# Patient Record
Sex: MALE | Race: WHITE
[De-identification: names, ages, dates, MRNs, and addresses within clinical notes are randomized per-mention and may not be internally consistent; named-entity substitution may affect disease eponyms.]

---

## 2019-03-21 ENCOUNTER — HOSPITAL ENCOUNTER (INPATIENT)
Dept: HOSPITAL 91 - E/R | Age: 44
LOS: 1 days | Discharge: HOME | DRG: 378 | End: 2019-03-22
Payer: MEDICAID

## 2019-03-21 ENCOUNTER — HOSPITAL ENCOUNTER (INPATIENT)
Dept: HOSPITAL 10 - E/R | Age: 44
LOS: 1 days | Discharge: HOME | DRG: 378 | End: 2019-03-22
Payer: COMMERCIAL

## 2019-03-21 VITALS
WEIGHT: 219.8 LBS | BODY MASS INDEX: 31.47 KG/M2 | HEIGHT: 70 IN | WEIGHT: 219.8 LBS | HEIGHT: 70 IN | BODY MASS INDEX: 31.47 KG/M2

## 2019-03-21 DIAGNOSIS — F15.10: ICD-10-CM

## 2019-03-21 DIAGNOSIS — E66.9: ICD-10-CM

## 2019-03-21 DIAGNOSIS — K22.10: ICD-10-CM

## 2019-03-21 DIAGNOSIS — Z71.3: ICD-10-CM

## 2019-03-21 DIAGNOSIS — K92.0: Primary | ICD-10-CM

## 2019-03-21 DIAGNOSIS — F11.10: ICD-10-CM

## 2019-03-21 LAB
ADD MAN DIFF?: NO
ALANINE AMINOTRANSFERASE: 22 IU/L (ref 13–69)
ALBUMIN/GLOBULIN RATIO: 1.25
ALBUMIN: 4.4 G/DL (ref 3.3–4.9)
ALKALINE PHOSPHATASE: 101 IU/L (ref 42–121)
ANION GAP: 12 (ref 5–13)
ASPARTATE AMINO TRANSFERASE: 40 IU/L (ref 15–46)
BASOPHIL #: 0.1 10^3/UL (ref 0–0.1)
BASOPHILS %: 0.4 % (ref 0–2)
BILIRUBIN,DIRECT: 0 MG/DL (ref 0–0.2)
BILIRUBIN,TOTAL: 0.1 MG/DL (ref 0.2–1.3)
BLOOD UREA NITROGEN: 20 MG/DL (ref 7–20)
CALCIUM: 9.5 MG/DL (ref 8.4–10.2)
CARBON DIOXIDE: 32 MMOL/L (ref 21–31)
CHLORIDE: 96 MMOL/L (ref 97–110)
CREATININE: 1.11 MG/DL (ref 0.61–1.24)
EOSINOPHILS #: 0.3 10^3/UL (ref 0–0.5)
EOSINOPHILS %: 2.2 % (ref 0–7)
GLOBULIN: 3.5 G/DL (ref 1.3–3.2)
GLUCOSE: 102 MG/DL (ref 70–220)
HEMATOCRIT: 42.4 % (ref 42–52)
HEMOGLOBIN: 13.6 G/DL (ref 14–18)
IMMATURE GRANS #M: 0.07 10^3/UL (ref 0–0.03)
IMMATURE GRANS % (M): 0.6 % (ref 0–0.43)
INR: 0.8
LYMPHOCYTES #: 3.1 10^3/UL (ref 0.8–2.9)
LYMPHOCYTES %: 26 % (ref 15–51)
MEAN CORPUSCULAR HEMOGLOBIN: 26.4 PG (ref 29–33)
MEAN CORPUSCULAR HGB CONC: 32.1 G/DL (ref 32–37)
MEAN CORPUSCULAR VOLUME: 82.2 FL (ref 82–101)
MEAN PLATELET VOLUME: 9.3 FL (ref 7.4–10.4)
MONOCYTE #: 1 10^3/UL (ref 0.3–0.9)
MONOCYTES %: 8.3 % (ref 0–11)
NEUTROPHIL #: 7.5 10^3/UL (ref 1.6–7.5)
NEUTROPHILS %: 62.5 % (ref 39–77)
NUCLEATED RED BLOOD CELLS #: 0 10^3/UL (ref 0–0)
NUCLEATED RED BLOOD CELLS%: 0 /100WBC (ref 0–0)
PARTIAL THROMBOPLASTIN TIME: 30.9 SEC (ref 23–35)
PLATELET COUNT: 357 10^3/UL (ref 140–415)
POTASSIUM: 4 MMOL/L (ref 3.5–5.1)
PROTIME: 11.2 SEC (ref 11.9–14.9)
PT RATIO: 0.9
RED BLOOD COUNT: 5.16 10^6/UL (ref 4.7–6.1)
RED CELL DISTRIBUTION WIDTH: 13.3 % (ref 11.5–14.5)
SODIUM: 140 MMOL/L (ref 135–144)
TOTAL PROTEIN: 7.9 G/DL (ref 6.1–8.1)
TROPONIN-I: < 0.012 NG/ML (ref 0–0.12)
WHITE BLOOD COUNT: 12 10^3/UL (ref 4.8–10.8)

## 2019-03-21 PROCEDURE — C9113 INJ PANTOPRAZOLE SODIUM, VIA: HCPCS

## 2019-03-21 PROCEDURE — 80061 LIPID PANEL: CPT

## 2019-03-21 PROCEDURE — 96376 TX/PRO/DX INJ SAME DRUG ADON: CPT

## 2019-03-21 PROCEDURE — 88305 TISSUE EXAM BY PATHOLOGIST: CPT

## 2019-03-21 PROCEDURE — 86900 BLOOD TYPING SEROLOGIC ABO: CPT

## 2019-03-21 PROCEDURE — 71045 X-RAY EXAM CHEST 1 VIEW: CPT

## 2019-03-21 PROCEDURE — 86850 RBC ANTIBODY SCREEN: CPT

## 2019-03-21 PROCEDURE — 86901 BLOOD TYPING SEROLOGIC RH(D): CPT

## 2019-03-21 PROCEDURE — 96374 THER/PROPH/DIAG INJ IV PUSH: CPT

## 2019-03-21 PROCEDURE — 83036 HEMOGLOBIN GLYCOSYLATED A1C: CPT

## 2019-03-21 PROCEDURE — 85730 THROMBOPLASTIN TIME PARTIAL: CPT

## 2019-03-21 PROCEDURE — 83735 ASSAY OF MAGNESIUM: CPT

## 2019-03-21 PROCEDURE — 93005 ELECTROCARDIOGRAM TRACING: CPT

## 2019-03-21 PROCEDURE — 99285 EMERGENCY DEPT VISIT HI MDM: CPT

## 2019-03-21 PROCEDURE — 84443 ASSAY THYROID STIM HORMONE: CPT

## 2019-03-21 PROCEDURE — 84484 ASSAY OF TROPONIN QUANT: CPT

## 2019-03-21 PROCEDURE — 88312 SPECIAL STAINS GROUP 1: CPT

## 2019-03-21 PROCEDURE — 85610 PROTHROMBIN TIME: CPT

## 2019-03-21 PROCEDURE — 80053 COMPREHEN METABOLIC PANEL: CPT

## 2019-03-21 PROCEDURE — 85025 COMPLETE CBC W/AUTO DIFF WBC: CPT

## 2019-03-21 PROCEDURE — 36415 COLL VENOUS BLD VENIPUNCTURE: CPT

## 2019-03-21 RX ADMIN — PANTOPRAZOLE SODIUM 1 MLS/HR: 40 INJECTION, POWDER, FOR SOLUTION INTRAVENOUS at 23:47

## 2019-03-21 RX ADMIN — PANTOPRAZOLE SODIUM 1 MLS/HR: 40 INJECTION, POWDER, FOR SOLUTION INTRAVENOUS at 23:27

## 2019-03-21 NOTE — ERD
ER Documentation


Chief Complaint


Chief Complaint





C/O LT SIDED CP RADIATING TO BACK X1 HR, STATES VOMITING BLOOD X1





HPI


43-year-old male history of heroin abuse, peptic ulcer disease who presents to 


the emergency room with epigastric abdominal pain and gross hematemesis over the


past 12 hours.  The patient describes 1-2 episodes of gross hematemesis with 


clots.  He describes some possible darker stool but no son melena.  He denies 


any fevers chills chest pain or shortness of breath.  He does note some burning 


discomfort from the epigastrium into his chest however.





ROS


All systems reviewed and are negative except as per history of present illness.





Medications


Home Meds


Reported Medications


Naproxen* (Naproxen*) 500 Mg Tablet, 500 MG PO BID, TAB


   3/21/19


Omeprazole* (Omeprazole*) 40 Mg Capsule.dr, 40 MG PO DAILY, #30 CAP


   3/21/19


Hydrocodone/Acetaminophen (Norco  Tablet) 1 Each Tablet, 1 EACH PO AS 


NEEDED, TAB


   3/21/19


Discontinued Scripts


Diphenhydramine Hcl* (Benadryl*) 50 Mg Cap, 50 MG PO Q6H PRN for ITCHING/RASH, 


#30 CAP


   Prov:JUAN FENG PA-C         7/28/16


Prednisone* (Prednisone*) 20 Mg Tab, 40 MG PO DAILY for 4 Days, TAB


   Prov:JUAN FENG PA-C         7/28/16


Clotrimazole* (Clotrimazole* AF) 1% - 30 Gm Cream.gm., 1 APPLIC TOP BID for 7 


Days, TUB


   Prov:JUAN FENG PA-C         7/28/16





Allergies


Allergies:  


Coded Allergies:  


     No Known Allergy (Unverified , 3/21/19)





PMhx/Soc


Medical and Surgical Hx:  pt denies Surgical Hx


History of Surgery:  No


Anesthesia Reaction:  No


Hx Neurological Disorder:  No


Hx Respiratory Disorders:  No


Hx Cardiac Disorders:  No


Hx Psychiatric Problems:  No


Hx Miscellaneous Medical Probl:  Yes (ULCER)


Hx Alcohol Use:  No


Hx Substance Use:  Yes (METH AND HEROIN)


Hx Tobacco Use:  Yes


Smoking Status:  Current every day smoker





FmHx


Family History:  No diabetes





Physical Exam


Vitals





Vital Signs


  Date      Temp  Pulse  Resp  B/P (MAP)   Pulse Ox  O2          O2 Flow    FiO2


Time                                                 Delivery    Rate


   3/21/19           62    16      131/84        98  Room Air


     23:32                          (100)


   3/21/19  97.7     81    18      141/77        98


     22:16                           (98)





Physical Exam


General: Well developed, well nourished, no acute distress


Head: Normocephalic, atraumatic.


Eyes: Pupils equally reactive, EOM intact


ENT: Moist mucous membranes


Neck: Supple, no lymphadenopathy


Respiratory: Lungs clear bilaterally, no distress


Cardiovascular: RRR, no murmurs, rubs, or gallops


Abdominal: Soft, non-tender, non-distended, no peritoneal signs


: Deferred


MSK: No edema, no unilateral swelling, 5/5 strength


Neurologic: Alert and oriented, moving all extremities, normal speech, no focal 


weakness, no cerebellar signs


Skin: No rash


Psych: Normal mood


Result Diagram:  


3/21/19 2242                                                                    


           3/21/19 2242





Results 24 hrs





Laboratory Tests


              Test
                                 3/21/19
22:42


              White Blood Count                     12.0 10^3/ul


              Red Blood Count                       5.16 10^6/ul


              Hemoglobin                               13.6 g/dl


              Hematocrit                                  42.4 %


              Mean Corpuscular Volume                    82.2 fl


              Mean Corpuscular Hemoglobin                26.4 pg


              Mean Corpuscular Hemoglobin
Concent     32.1 g/dl 



              Red Cell Distribution Width                 13.3 %


              Platelet Count                         357 10^3/UL


              Mean Platelet Volume                        9.3 fl


              Immature Granulocytes %                    0.600 %


              Neutrophils %                               62.5 %


              Lymphocytes %                               26.0 %


              Monocytes %                                  8.3 %


              Eosinophils %                                2.2 %


              Basophils %                                  0.4 %


              Nucleated Red Blood Cells %            0.0 /100WBC


              Immature Granulocytes #              0.070 10^3/ul


              Neutrophils #                          7.5 10^3/ul


              Lymphocytes #                          3.1 10^3/ul


              Monocytes #                            1.0 10^3/ul


              Eosinophils #                          0.3 10^3/ul


              Basophils #                            0.1 10^3/ul


              Nucleated Red Blood Cells #            0.0 10^3/ul


              Prothrombin Time                          11.2 Sec


              Prothrombin Time Ratio                         0.9


              INR International Normalized
Ratio           0.80 



              Activated Partial
Thromboplast Time      30.9 Sec 



              Sodium Level                            140 mmol/L


              Potassium Level                         4.0 mmol/L


              Chloride Level                           96 mmol/L


              Carbon Dioxide Level                     32 mmol/L


              Anion Gap                                       12


              Blood Urea Nitrogen                       20 mg/dl


              Creatinine                              1.11 mg/dl


              Est Glomerular Filtrat Rate
mL/min   > 60 mL/min 



              Glucose Level                            102 mg/dl


              Calcium Level                            9.5 mg/dl


              Total Bilirubin                          0.1 mg/dl


              Direct Bilirubin                        0.00 mg/dl


              Indirect Bilirubin                       0.1 mg/dl


              Aspartate Amino Transf
(AST/SGOT)         40 IU/L 



              Alanine Aminotransferase
(ALT/SGPT)       22 IU/L 



              Alkaline Phosphatase                      101 IU/L


              Troponin I                           < 0.012 ng/ml


              Total Protein                             7.9 g/dl


              Albumin                                   4.4 g/dl


              Globulin                                 3.50 g/dl


              Albumin/Globulin Ratio                        1.25





Current Medications


 Medications
   Dose
          Sig/Sergey
       Start Time
   Status  Last


 (Trade)       Ordered        Route
 PRN     Stop Time              Admin
Dose


                              Reason                                Admin


 Pantoprazole   100 ml @ 
     ONCE  STAT
    3/21/19       DC           3/21/19


80
 mg/Sodium  400 mls/hr     IVPB
          22:32
                       23:27



Chloride                                     3/21/19 22:46


 Pantoprazole   100 ml @ 
     ONCE  STAT
    3/21/19                    3/21/19


80
 mg/Sodium  10 mls/hr      IV
            22:32
                       23:47



Chloride                                     3/22/19 08:31


 Ondansetron    4 mg           BRIDGE ORDER   3/22/19                



HCl
  (Zofran                 PRN
 IV
       01:00



Inj)                          NAUSEA/VOMITI  3/23/19 00:59


                              NG


                650 mg         ER BRIDGE      3/22/19                



Acetaminophen                 PRN
 PO
       01:00




  (Tylenol                   .MILD PAIN     3/23/19 00:59


Tab)                          1-3 OR TEMP








Procedures/MDM


EKG, MONITORS, & DIAGNOSTIC IMAGING:


EKG: I reviewed and interpreted a 12-lead EKG. 


Rhythm: Normal sinus rhythm


ST Changes: No contiguous ST segment elevations


T waves: No contiguous T wave inversions


Impression: No evidence of acute cardiac ischemia





Chest x-ray: I reviewed and interpreted a 1 view of the chest


Mediastinum: No enlargement


Cardiac silhouette: No cardiomegaly


Airspace: Clear lung fields bilaterally without evidence of pneumothorax


Bones: No evidence of fracture








LAB INTERPRETATION:


I reviewed the laboratory testing and it shows no significant anemia





MEDICAL DECISION MAKING:


The patient has a known history of peptic ulcer disease with 2 episodes of gross


hematemesis raising the concern for active upper GI hemorrhage.  The patient has


hemodynamic stability.  I do not believe that emergent endoscopy is necessary 


but inpatient hospitalization for GI consultation and semi-urgent endoscopy 


would be appropriate.





Patient is also requesting stool ova and parasite evaluation given that he 


states a history of parasitic infection.  He denies any fevers or chills.  He 


does describe some looser stools.  These test have been ordered.  No indication 


for empiric therapy.





ER COURSE:


* Large bore peripheral IV was inserted.  The patient was given Protonix bolus 


  and drip given known peptic ulcer disease.


* No history of cirrhosis or alcohol abuse.


* No indication for transfusion at this time





CONSULTATION:


None





DISPOSITION PLAN: Inpatient hospital station for GI consultation in the setting 


of acute upper GI hemorrhage 


Accepting care team and consultations:


I discussed the current laboratory data, diagnostic imaging and emergency care 


provided.


Admitting team: Dr. Trinidad


Admitting team indication: Insurance directed





Departure


Diagnosis:  


   Primary Impression:  


   Upper GI bleed


Condition:  Stable











BETH HDZ MD          Mar 21, 2019 23:28

## 2019-03-22 VITALS — DIASTOLIC BLOOD PRESSURE: 62 MMHG | HEART RATE: 60 BPM | RESPIRATION RATE: 18 BRPM | SYSTOLIC BLOOD PRESSURE: 116 MMHG

## 2019-03-22 VITALS — HEART RATE: 66 BPM | SYSTOLIC BLOOD PRESSURE: 120 MMHG | DIASTOLIC BLOOD PRESSURE: 61 MMHG | RESPIRATION RATE: 21 BRPM

## 2019-03-22 VITALS — DIASTOLIC BLOOD PRESSURE: 64 MMHG | RESPIRATION RATE: 22 BRPM | SYSTOLIC BLOOD PRESSURE: 119 MMHG | HEART RATE: 58 BPM

## 2019-03-22 VITALS — DIASTOLIC BLOOD PRESSURE: 68 MMHG | SYSTOLIC BLOOD PRESSURE: 123 MMHG | HEART RATE: 54 BPM | RESPIRATION RATE: 18 BRPM

## 2019-03-22 VITALS — DIASTOLIC BLOOD PRESSURE: 71 MMHG | RESPIRATION RATE: 16 BRPM | HEART RATE: 84 BPM | SYSTOLIC BLOOD PRESSURE: 126 MMHG

## 2019-03-22 VITALS — SYSTOLIC BLOOD PRESSURE: 118 MMHG | HEART RATE: 60 BPM | RESPIRATION RATE: 19 BRPM | DIASTOLIC BLOOD PRESSURE: 63 MMHG

## 2019-03-22 VITALS — RESPIRATION RATE: 16 BRPM | SYSTOLIC BLOOD PRESSURE: 119 MMHG | DIASTOLIC BLOOD PRESSURE: 62 MMHG

## 2019-03-22 VITALS — SYSTOLIC BLOOD PRESSURE: 118 MMHG | DIASTOLIC BLOOD PRESSURE: 62 MMHG | HEART RATE: 6 BPM | RESPIRATION RATE: 22 BRPM

## 2019-03-22 VITALS — SYSTOLIC BLOOD PRESSURE: 112 MMHG | RESPIRATION RATE: 18 BRPM | DIASTOLIC BLOOD PRESSURE: 62 MMHG | HEART RATE: 76 BPM

## 2019-03-22 VITALS — RESPIRATION RATE: 17 BRPM | HEART RATE: 60 BPM | SYSTOLIC BLOOD PRESSURE: 113 MMHG | DIASTOLIC BLOOD PRESSURE: 65 MMHG

## 2019-03-22 LAB
ADD MAN DIFF?: NO
ALANINE AMINOTRANSFERASE: 25 IU/L (ref 13–69)
ALBUMIN/GLOBULIN RATIO: 1.22
ALBUMIN: 3.8 G/DL (ref 3.3–4.9)
ALKALINE PHOSPHATASE: 84 IU/L (ref 42–121)
ANION GAP: 10 (ref 5–13)
ASPARTATE AMINO TRANSFERASE: 29 IU/L (ref 15–46)
BASOPHIL #: 0 10^3/UL (ref 0–0.1)
BASOPHILS %: 0.4 % (ref 0–2)
BILIRUBIN,DIRECT: 0 MG/DL (ref 0–0.2)
BILIRUBIN,TOTAL: 0.3 MG/DL (ref 0.2–1.3)
BLOOD UREA NITROGEN: 17 MG/DL (ref 7–20)
CALCIUM: 9 MG/DL (ref 8.4–10.2)
CARBON DIOXIDE: 30 MMOL/L (ref 21–31)
CHLORIDE: 101 MMOL/L (ref 97–110)
CHOL/HDL RATIO: 3.9 RATIO
CHOLESTEROL: 137 MG/DL (ref 100–200)
CREATININE: 0.97 MG/DL (ref 0.61–1.24)
EOSINOPHILS #: 0.3 10^3/UL (ref 0–0.5)
EOSINOPHILS %: 3 % (ref 0–7)
GLOBULIN: 3.1 G/DL (ref 1.3–3.2)
GLUCOSE: 101 MG/DL (ref 70–220)
HDL CHOLESTEROL: 35 MG/DL (ref 27–67)
HEMATOCRIT: 40.4 % (ref 42–52)
HEMOGLOBIN A1C: 5.6 % (ref 0–5.9)
HEMOGLOBIN: 13.2 G/DL (ref 14–18)
IMMATURE GRANS #M: 0.02 10^3/UL (ref 0–0.03)
IMMATURE GRANS % (M): 0.2 % (ref 0–0.43)
LDL CHOLESTEROL,CALCULATED: 71 MG/DL
LYMPHOCYTES #: 3.2 10^3/UL (ref 0.8–2.9)
LYMPHOCYTES %: 31.7 % (ref 15–51)
MAGNESIUM: 2 MG/DL (ref 1.7–2.5)
MEAN CORPUSCULAR HEMOGLOBIN: 26.5 PG (ref 29–33)
MEAN CORPUSCULAR HGB CONC: 32.7 G/DL (ref 32–37)
MEAN CORPUSCULAR VOLUME: 81 FL (ref 82–101)
MEAN PLATELET VOLUME: 9.5 FL (ref 7.4–10.4)
MONOCYTE #: 0.9 10^3/UL (ref 0.3–0.9)
MONOCYTES %: 8.5 % (ref 0–11)
NEUTROPHIL #: 5.7 10^3/UL (ref 1.6–7.5)
NEUTROPHILS %: 56.2 % (ref 39–77)
NUCLEATED RED BLOOD CELLS #: 0 10^3/UL (ref 0–0)
NUCLEATED RED BLOOD CELLS%: 0 /100WBC (ref 0–0)
PLATELET COUNT: 328 10^3/UL (ref 140–415)
POTASSIUM: 3.9 MMOL/L (ref 3.5–5.1)
RED BLOOD COUNT: 4.99 10^6/UL (ref 4.7–6.1)
RED CELL DISTRIBUTION WIDTH: 13.6 % (ref 11.5–14.5)
SODIUM: 141 MMOL/L (ref 135–144)
THYROID STIMULATING HORMONE: 2.2 MIU/L (ref 0.47–4.68)
TOTAL PROTEIN: 6.9 G/DL (ref 6.1–8.1)
TRIGLYCERIDES: 153 MG/DL (ref 0–149)
WHITE BLOOD COUNT: 10.1 10^3/UL (ref 4.8–10.8)

## 2019-03-22 PROCEDURE — 0DB68ZX EXCISION OF STOMACH, VIA NATURAL OR ARTIFICIAL OPENING ENDOSCOPIC, DIAGNOSTIC: ICD-10-PCS | Performed by: INTERNAL MEDICINE

## 2019-03-22 PROCEDURE — 0DB78ZX EXCISION OF STOMACH, PYLORUS, VIA NATURAL OR ARTIFICIAL OPENING ENDOSCOPIC, DIAGNOSTIC: ICD-10-PCS

## 2019-03-22 PROCEDURE — 0DB68ZX EXCISION OF STOMACH, VIA NATURAL OR ARTIFICIAL OPENING ENDOSCOPIC, DIAGNOSTIC: ICD-10-PCS

## 2019-03-22 PROCEDURE — 0DB78ZX EXCISION OF STOMACH, PYLORUS, VIA NATURAL OR ARTIFICIAL OPENING ENDOSCOPIC, DIAGNOSTIC: ICD-10-PCS | Performed by: INTERNAL MEDICINE

## 2019-03-22 RX ADMIN — MORPHINE SULFATE 1 MG: 2 INJECTION, SOLUTION INTRAMUSCULAR; INTRAVENOUS at 09:09

## 2019-03-22 RX ADMIN — THIAMINE HYDROCHLORIDE 1 MLS/HR: 100 INJECTION, SOLUTION INTRAMUSCULAR; INTRAVENOUS at 13:27

## 2019-03-22 RX ADMIN — VASOPRESSIN 1 MLS/HR: 20 INJECTION, SOLUTION INTRAMUSCULAR; SUBCUTANEOUS at 08:32

## 2019-03-22 RX ADMIN — FOLIC ACID SCH MLS/HR: 5 INJECTION, SOLUTION INTRAMUSCULAR; INTRAVENOUS; SUBCUTANEOUS at 13:27

## 2019-03-22 RX ADMIN — FOLIC ACID SCH MLS/HR: 5 INJECTION, SOLUTION INTRAMUSCULAR; INTRAVENOUS; SUBCUTANEOUS at 00:57

## 2019-03-22 RX ADMIN — NICOTINE 1 PATCH: 21 PATCH, EXTENDED RELEASE TRANSDERMAL at 09:06

## 2019-03-22 RX ADMIN — THIAMINE HYDROCHLORIDE 1 MLS/HR: 100 INJECTION, SOLUTION INTRAMUSCULAR; INTRAVENOUS at 00:57

## 2019-03-22 NOTE — DS
Date/Time of Note


Date/Time of Note


DATE: 3/22/19 


TIME: 15:54





Discharge Summary


Admission/Discharge Info


Admit Date/Time


Mar 22, 2019 at 00:55


Discharge Date/Time


Mar 22, 2019 at 15:20


Discharge Diagnosis


1.  Hematemesis.





2.  Ulcer of the esophagus without bleeding.





3.  Obesity.  BMI 31.5 kg/m.





4.  Substance abuse.


Patient Condition:  Stable


Consults


1. Cheko Díaz MD, Gastroenterology.


Procedures


Esophagogastroduodenoscopy on 3/22/2019


Impression: Ulcer of the esophagus without bleeding.


Hx of Present Illness


This is a 43-year-old male with past medical history of heroin abuse, peptic 


ulcer disease, and obesity who came to the emergency room with epigastric 


abdominal pain and gross hematemesis.  The patient denied any melena or 


hematochezia.  The patient denied any chest pain or dyspnea.  The patient had a 


hemoglobin hematocrit of 13.6 and 42.4 respectively.  The patient was admitted 


to inpatient setting for further treatment and evaluation.


Hospital Course


The patient was admitted to inpatient setting.  The patient underwent an 


esophagogastroduodenoscopy on 3/22/2019 that showed an esophageal ulcer.  


Gastroenterology recommended proton pump inhibitor therapy.  The patient's H&H 


remained stable.  The patient was cleared by gastroenterology to be discharged 


home.  The patient is obese with a BMI of 31.5 kg/m.  The patient was advised 


on weight reduction.  The patient is also a current substance abuser.  The 


patient was advised to abstain from using recreational drugs and alcohol.





Discharge Instructions


1.  Take a regular diet.


2.  Start taking Protonix twice daily.


3.  Avoid consuming alcohol.


4.  Resume activities as tolerated.


5.  Follow-up with your primary care physician in 2 weeks.


6.  Please go to the nearest emergency room if you have vomiting blood, dark 


tarry stools, or blood in stool.





The patient verbalized understanding of his discharge instructions.





At this time I would like to thank Dr. Díaz for seeing the patient, doing the 


necessary procedures, and providing clinical recommendations.





The patient was seen in collaboration with Dr. Bailon.


Home Meds


Active Scripts


Pantoprazole* (Protonix*) 40 Mg Tablet., 40 MG PO BID, #60 TAB


   Prov:RICKEY WADDELL NP         3/22/19


Discontinued Reported Medications


Naproxen* (Naproxen*) 500 Mg Tablet, 500 MG PO BID, TAB


   3/21/19


Omeprazole* (Omeprazole*) 40 Mg Capsule.dr, 40 MG PO DAILY, #30 CAP


   3/21/19


Hydrocodone/Acetaminophen (Norco  Tablet) 1 Each Tablet, 1 EACH PO AS NEED


ED, TAB


   3/21/19


Discontinued Scripts


Diphenhydramine Hcl* (Benadryl*) 50 Mg Cap, 50 MG PO Q6H PRN for ITCHING/RASH, 


#30 CAP


   Prov:JUAN FENG PA-C         7/28/16


Prednisone* (Prednisone*) 20 Mg Tab, 40 MG PO DAILY for 4 Days, TAB


   Prov:JUAN FENG PA-C         7/28/16


Clotrimazole* (Clotrimazole* AF) 1% - 30 Gm Cream.gm., 1 APPLIC TOP BID for 7 


Days, TUB


   Prov:JUAN FENG PA-C         7/28/16


Follow-up Plan


Follow-up with your primary care physician in 2 weeks.


Primary Care Provider


Care Physician No Primary


Time spent on discharge:   > 30 minutes


Pending Labs





Laboratory Tests


Test
                                   3/21/19
22:42              3/22/19
04:51


White Blood Count
            12.0 10^3/ul
(4.8-10.8)    10.1 10^3/ul
(4.8-10.8)


Red Blood Count
             5.16 10^6/ul
(4.70-6.10)   4.99 10^6/ul
(4.70-6.10)


Hemoglobin
                     13.6 g/dl
(14.0-18.0)      13.2 g/dl
(14.0-18.0)


Hematocrit
                        42.4 %
(42.0-52.0)         40.4 %
(42.0-52.0)


Mean Corpuscular Volume
         82.2 fl
(82.0-101.0)       81.0 fl
(82.0-101.0)


Mean Corpuscular                  26.4 pg
(29.0-33.0)        26.5 pg
(29.0-33.0)


Hemoglobin



Mean Corpuscular                32.1 g/dl
(32.0-37.0)      32.7 g/dl
(32.0-37.0)


Hemoglobin
Concent


Red Cell Distribution              13.3 %
(11.5-14.5)         13.6 %
(11.5-14.5)


Width



Platelet Count
                 357 10^3/UL
(140-415)      328 10^3/UL
(140-415)


Mean Platelet Volume
               9.3 fl
(7.4-10.4)          9.5 fl
(7.4-10.4)


Immature Granulocytes %
        0.600 %
(0.001-0.429)      0.200 %
(0.001-0.429)


Neutrophils %
                     62.5 %
(39.0-77.0)         56.2 %
(39.0-77.0)


Lymphocytes %
                     26.0 %
(15.0-51.0)         31.7 %
(15.0-51.0)


Monocytes %
                         8.3 %
(0.0-11.0)           8.5 %
(0.0-11.0)


Eosinophils %
                        2.2 %
(0.0-7.0)            3.0 %
(0.0-7.0)


Basophils %
                          0.4 %
(0.0-2.0)            0.4 %
(0.0-2.0)


Nucleated Red Blood Cells       0.0 /100WBC
(0.0-0.0)      0.0 /100WBC
(0.0-0.0)


%



Immature Granulocytes #
    0.070 10^3/ul
(0.0-0.031)  0.020 10^3/ul
(0.0-0.031)


Neutrophils #
                  7.5 10^3/ul
(1.6-7.5)      5.7 10^3/ul
(1.6-7.5)


Lymphocytes #
                  3.1 10^3/ul
(0.8-2.9)      3.2 10^3/ul
(0.8-2.9)


Monocytes #
                    1.0 10^3/ul
(0.3-0.9)      0.9 10^3/ul
(0.3-0.9)


Eosinophils #
                  0.3 10^3/ul
(0.0-0.5)      0.3 10^3/ul
(0.0-0.5)


Basophils #
                    0.1 10^3/ul
(0.0-0.1)      0.0 10^3/ul
(0.0-0.1)


Nucleated Red Blood Cells       0.0 10^3/ul
(0.0-0.0)      0.0 10^3/ul
(0.0-0.0)


#



Prothrombin Time
                11.2 Sec
(11.9-14.9)  



Prothrombin Time Ratio                           0.9


INR International                              0.80 
  



Normalized
Ratio


Activated                        30.9 Sec
(23.0-35.0)  



Partial
Thromboplast Time


Sodium Level
                    140 mmol/L
(135-144)       141 mmol/L
(135-144)


Potassium Level
                 4.0 mmol/L
(3.5-5.1)       3.9 mmol/L
(3.5-5.1)


Chloride Level
                    96 mmol/L
()        101 mmol/L
()


Carbon Dioxide Level
               32 mmol/L
(21-31)          30 mmol/L
(21-31)


Anion Gap                                  12 (5-13)                  10 (5-13)


Blood Urea Nitrogen
                  20 mg/dl
(7-20)            17 mg/dl
(7-20)


Creatinine
                    1.11 mg/dl
(0.61-1.24)     0.97 mg/dl
(0.61-1.24)


Est Glomerular Filtrat      > 60 mL/min
(>60)          > 60 mL/min
(>60)


Rate
mL/min


Glucose Level
                     102 mg/dl
()         101 mg/dl
()


Calcium Level
                   9.5 mg/dl
(8.4-10.2)       9.0 mg/dl
(8.4-10.2)


Total Bilirubin
                  0.1 mg/dl
(0.2-1.3)        0.3 mg/dl
(0.2-1.3)


Direct Bilirubin
              0.00 mg/dl
(0.00-0.20)     0.00 mg/dl
(0.00-0.20)


Indirect Bilirubin
                 0.1 mg/dl
(0-1.1)          0.3 mg/dl
(0-1.1)


Aspartate Amino                       40 IU/L
(15-46)            29 IU/L
(15-46)


Transf
(AST/SGOT)


Alanine                               22 IU/L
(13-69)            25 IU/L
(13-69)


Aminotransferase
(ALT/SGPT


)


Alkaline Phosphatase
               101 IU/L
()           84 IU/L
()


Troponin I
                 < 0.012                    



                            ng/ml
(0.000-0.120)


Total Protein
                     7.9 g/dl
(6.1-8.1)         6.9 g/dl
(6.1-8.1)


Albumin
                           4.4 g/dl
(3.3-4.9)         3.8 g/dl
(3.3-4.9)


Globulin
                         3.50 g/dl
(1.3-3.2)        3.10 g/dl
(1.3-3.2)


Albumin/Globulin Ratio                          1.25                       1.22


Hemoglobin A1c                                                    5.6 % (0-5.9)


Magnesium Level
            
                                2.0 mg/dl
(1.7-2.5)


Triglycerides Level
        
                                  153 mg/dl
(0-149)


Cholesterol Level
          
                                137 mg/dl
(100-200)


LDL Cholesterol,                                                       71 mg/dl


Calculated


HDL Cholesterol
            
                                   35 mg/dl
(27-67)


Cholesterol/HDL Ratio                                                 3.9 RATIO


Thyroid Stimulating         
                          2.200 MIU/L
(0.465-4.680)


Hormone
(TSH)














RICKEY WADDELL NP               Mar 22, 2019 15:54

## 2019-03-22 NOTE — PREAC
Date/Time of Note


Date/Time of Note


DATE: 3/22/19 


TIME: 13:06





Anesthesia Eval and Record


Evaluation


Time Pre-Procedure Interview


DATE: 3/22/19 


TIME: 13:06


Age


43


Sex


male


NPO:  8 hrs


Preoperative diagnosis


upper GI bleeding


Planned procedure


EGD





Past Medical History


Past Medical History:  Includes


Pulm:  Smoking Hx, Other (on PATCH)


GI:  Other (peptic ulcer)


Recreational drugs:  Heroin





Surgery & Anesthesia Issues


No known issue





Meds


Anticoagulation:  No


Beta Blocker within 24 hr:  No


Reason Beta Blocker not given:  Pt. not on B-Blocker


Reported Medications


Naproxen* (Naproxen*) 500 Mg Tablet, 500 MG PO BID, TAB


   3/21/19


Omeprazole* (Omeprazole*) 40 Mg Capsule.dr, 40 MG PO DAILY, #30 CAP


   3/21/19


Hydrocodone/Acetaminophen (Norco  Tablet) 1 Each Tablet, 1 EACH PO AS 


NEEDED, TAB


   3/21/19


Discontinued Scripts


Diphenhydramine Hcl* (Benadryl*) 50 Mg Cap, 50 MG PO Q6H PRN for ITCHING/RASH, 


#30 CAP


   Prov:JUAN FENG PA-C         7/28/16


Prednisone* (Prednisone*) 20 Mg Tab, 40 MG PO DAILY for 4 Days, TAB


   Prov:JUAN FENG PA-C         7/28/16


Clotrimazole* (Clotrimazole* AF) 1% - 30 Gm Cream.gm., 1 APPLIC TOP BID for 7 


Days, TUB


   Prov:JUAN FENG PA-C         7/28/16





Current Medications


Sodium Chloride 1,000 ml @  80 mls/hr [id number] IV  Last administered on 3/22/19at


00:57; Admin Dose 80 MLS/HR;  Start 3/22/19 at 00:57


IV Flush (NS 3 ml) 3 ml PER PROTOCOL IV ;  Start 3/22/19 at 01:00


Ondansetron HCl (Zofran Inj) 4 mg Q6H  PRN IV NAUSEA/VOMITING;  Start 3/22/19 at


01:00


Acetaminophen (Tylenol Tab) 650 mg Q6H  PRN PO .PAIN 1-3 OR TEMP;  Start 3/22/19


at 01:00


Morphine Sulfate (morphine) 2 mg Q4H  PRN IV .SEVERE PAIN 7-10 Last administered


on 3/22/19at 09:09; Admin Dose 2 MG;  Start 3/22/19 at 01:00


Docusate Sodium (Colace) 100 mg Q12H  PRN PO .CONSTIPATION;  Start 3/22/19 at 


01:00


Bisacodyl (Dulcolax) 5 mg DAILY  PRN PO .CONSTIPATION;  Start 3/22/19 at 01:00


Pantoprazole 80 mg/Sodium Chloride 100 ml @  10 mls/hr Q10H IV ;  Start 3/22/19 


at 08:32


Meds reviewed:  Yes





Allergies


Coded Allergies:  


     No Known Allergy (Unverified , 3/21/19)


Allergies Reviewed:  Yes





Labs/Studies


Labs Reviewed:  Reviewed by anesthesiologist


Result Diagram:  


3/22/19 0451                                                                    


           3/22/19 0451





Laboratory Tests


3/22/19 04:51











Blood Bank


                         Test
            3/21/19
22:42


                         Antibody Screen  NEGATIVE


                         Blood Type       O POSITIVE





Pregnancy test:  N/A


Studies:  ECG (sr), CXR (n/a)





Pre-procedure Exam


Last vitals





Vital Signs


  Date      Temp  Pulse  Resp  B/P (MAP)   Pulse Ox  O2          O2 Flow    FiO2


Time                                                 Delivery    Rate


   3/22/19  97.3     84    16      126/71        99  Room Air


     12:28                           (89)





Airway:  Adequate mouth opening


Mallampati:  Mallampati I


Teeth:  Normal


Lung:  Normal


Heart:  Normal





ASA Physical Status


ASA physical status:  2


Emergency:  None





Planned Anesthetic


General/MAC:  MAC





Planned Pain Management


Parenteral pain med





Pre-operative Attestations


Prior to commencing anesthesia and surgery, the patient was re-evaluated, there 


was verification of:


*The patient's identity


*The results of appropriate recent lab work and preoperative vital signs


*The above evaluation not changing prior to induction


*Anesthetic plan, risk benefits, alternative and complications discussed with 


patient/family; questions answered; patient/family understands, accepts and 


wishes to proceed.











VIVIAN COUCH MD            Mar 22, 2019 13:08

## 2019-03-22 NOTE — PAC
Date/Time of Note


Date/Time of Note


DATE: 3/22/19 


TIME: 17:35





Post-Anesthesia Notes


Post-Anesthesia Note


Last documented vital signs





Vital Signs


  Date      Temp  Pulse  Resp  B/P (MAP)   Pulse Ox  O2          O2 Flow    FiO2


Time                                                 Delivery    Rate


   3/22/19  98.8      6    22      118/62        97  Room Air


     13:56                           (80)


   3/22/19  98.9


     13:28





Activity:  WNL


Respiratory function:  WNL


Cardiovascular function:  WNL


Mental status:  Baseline


Pain reasonably controlled:  Yes


Hydration appropriate:  Yes


Nausea/Vomiting absent:  No











VIVIAN COUCH MD            Mar 22, 2019 17:35

## 2019-03-22 NOTE — PDOCDIS
Discharge Instructions


CONDITION


                [id number]
Patient Condition:  [id number]
Stable








HOME CARE INSTRUCTIONS:


                [id number]
Diet Instructions:  [id number]
Regular








FOLLOW UP/APPOINTMENTS


Follow-up Plan


Follow-up with your primary care physician in 2 weeks.





OTHER ORDERS:


Other Orders:


1.  Take a regular diet.


2.  Start taking Protonix twice daily.


3.  Avoid consuming alcohol.


4.  Resume activities as tolerated.


5.  Follow-up with your primary care physician in 2 weeks.


6.  Please go to the nearest emergency room if you have vomiting blood, dark 


tarry stools, or blood in stool.











RICKEY WADDELL NP               Mar 22, 2019 14:36

## 2019-03-22 NOTE — HP
Date/Time of Note


Date/Time of Note


DATE: 3/22/19 


TIME: 06:39





Assessment/Plan


VTE Prophylaxis


SCD applied (from Nsg):  Yes


Pharmacological prophylaxis:  NA/contraindicated


Pharm contraindication:  low risk/ambulating





Lines/Catheters


IV Catheter Type (from Nrsg):  Saline Lock





Assessment/Plan


Hospital Course


This is a 43-year-old male being admitted to the [facility] floor for:





#1 hematemesis: Secondary possibly to underlying peptic ulcer disease.  


Hemoglobin is stable.  Continue the patient on Protonix drip.  CBC every 6 


hours.  Will consult GI Dr. Díaz.





#2 peptic ulcer disease: Patient has a history of peptic ulcer disease this 


could be the reason for his hematemesis.  Again we will keep the patient on 


Protonix drip, consult GI check serial CBCs





#3 history of illicit drug use: Monitor for signs of withdrawal as patient 


states he is self detox and ran off of heroin.





#4 obesity: We will check hemoglobin A 1C, lipid panel, TSH





#5 tobacco use: Nicotine patch





#6 DVT GI prophylaxis: SCDs, Protonix drip





Further treatment strategy will be implemented as per the clinical course.


Result Diagram:  


3/22/19 0451                                                                    


           3/22/19 0451





Results 24hrs





Laboratory Tests


       Test
                                3/21/19
22:42  3/22/19
04:51


       White Blood Count                         12.0  #H         10.1


       Red Blood Count                             5.16           4.99


       Hemoglobin                                 13.6  L        13.2  L


       Hematocrit                                  42.4          40.4  L


       Mean Corpuscular Volume                     82.2          81.0  L


       Mean Corpuscular Hemoglobin                26.4  L        26.5  L


       Mean Corpuscular Hemoglobin
Concent        32.1  
        32.7  



       Red Cell Distribution Width                 13.3           13.6


       Platelet Count                               357            328


       Mean Platelet Volume                         9.3            9.5


       Immature Granulocytes %                   0.600  H        0.200


       Neutrophils %                               62.5           56.2


       Lymphocytes %                               26.0           31.7


       Monocytes %                                  8.3            8.5


       Eosinophils %                                2.2            3.0


       Basophils %                                  0.4            0.4


       Nucleated Red Blood Cells %                  0.0            0.0


       Immature Granulocytes #                   0.070  H        0.020


       Neutrophils #                                7.5            5.7


       Lymphocytes #                               3.1  H         3.2  H


       Monocytes #                                 1.0  H          0.9


       Eosinophils #                                0.3            0.3


       Basophils #                                  0.1            0.0


       Nucleated Red Blood Cells #                  0.0            0.0


       Prothrombin Time                           11.2  L


       Prothrombin Time Ratio                       0.9


       INR International Normalized
Ratio         0.80  
  



       Activated Partial
Thromboplast Time        30.9  
  



       Sodium Level                                 140            141


       Potassium Level                              4.0            3.9


       Chloride Level                               96  L          101


       Carbon Dioxide Level                         32  H           30


       Anion Gap                                     12             10


       Blood Urea Nitrogen                           20             17


       Creatinine                                  1.11           0.97


       Est Glomerular Filtrat Rate
mL/min   > 60  
        > 60  



       Glucose Level                                102            101


       Calcium Level                                9.5            9.0


       Total Bilirubin                             0.1  L          0.3


       Direct Bilirubin                            0.00           0.00


       Indirect Bilirubin                           0.1            0.3


       Aspartate Amino Transf
(AST/SGOT)            40  
          29  



       Alanine Aminotransferase
(ALT/SGPT)          22  
          25  



       Alkaline Phosphatase                         101             84


       Troponin I                           < 0.012


       Total Protein                                7.9           6.9  #


       Albumin                                      4.4            3.8


       Globulin                                   3.50  H         3.10


       Albumin/Globulin Ratio                      1.25           1.22


       Hemoglobin A1c                                              5.6


       Magnesium Level                                             2.0


       Triglycerides Level                                        153  H


       Cholesterol Level                                           137


       LDL Cholesterol, Calculated                                  71


       HDL Cholesterol                                              35


       Cholesterol/HDL Ratio                                       3.9


       Thyroid Stimulating Hormone
(TSH)    
                   2.200  









HPI/ROS


Admit Date/Time


Admit Date/Time








Hx of Present Illness


Chief complaint: Hematemesis





This is a 43-year-old male history of heroin abuse, peptic ulcer disease who 


presents to the emergency room with epigastric abdominal pain and gross 


hematemesis over the past 12 hours.  The patient describes 1-2 episodes of gross


hematemesis with clots.  He describes some possible darker stool but no son 


melena.  He denies any fevers chills chest pain or shortness of breath.  He does


note some burning discomfort from the epigastrium into his chest however.  


Patient reports that he is a heroin user and that he is currently on self detox 


for the last 3 days.





Allergies: NKDA





Medications: See MAR





ROS


Const: As per HPI


Eyes : No pain discharge or redness or change in visual acuity


ENT: No pain, sore throat, congestion, congestion,  dysphagia or discharge


Respiratory: No shortness of breath, cough, sputum, wheezing, or pleuritic pain


Cardiovascular: No chest pain, palpitation, PND, or edema


GI : As per HPI


Genitourinary: No dysuria, hematuria, flank pain ,  discharge or CVA tenderness


Musculoskeletal: No joint pain, back pain, neck pain, restricted range of motion


in neck or joints


Skin: No rash, bruising or hives 


Neuro: No headache, dizziness, syncope, seizure, focal weakness


Endocrine: No polyuria, polydipsia, temperature intolerance


Psych: No hallucination, depression, anxiety or suicidal ideation





PMH/Family/Social


Past Medical History


Peptic ulcer disease


Medications





Current Medications


Pantoprazole 80 mg/Sodium Chloride 100 ml @  10 mls/hr ONCE  STAT IV  Last 


administered on 3/21/19at 23:47; Admin Dose 10 MLS/HR;  Start 3/21/19 at 22:32; 


Stop 3/22/19 at 08:31


Ondansetron HCl (Zofran Inj) 4 mg BRIDGE ORDER PRN IV NAUSEA/VOMITING;  Start 


3/22/19 at 01:00;  Stop 3/23/19 at 00:59


Acetaminophen (Tylenol Tab) 650 mg ER BRIDGE PRN PO .MILD PAIN 1-3 OR TEMP;  


Start 3/22/19 at 01:00;  Stop 3/23/19 at 00:59


Sodium Chloride 1,000 ml @  80 mls/hr [id number] IV  Last administered on 3/22/19at


00:57; Admin Dose 80 MLS/HR;  Start 3/22/19 at 00:57


IV Flush (NS 3 ml) 3 ml PER PROTOCOL IV ;  Start 3/22/19 at 01:00


Ondansetron HCl (Zofran Inj) 4 mg Q6H  PRN IV NAUSEA/VOMITING;  Start 3/22/19 at


01:00


Acetaminophen (Tylenol Tab) 650 mg Q6H  PRN PO .PAIN 1-3 OR TEMP;  Start 3/22/19


at 01:00


Morphine Sulfate (morphine) 2 mg Q4H  PRN IV .SEVERE PAIN 7-10;  Start 3/22/19 


at 01:00


Docusate Sodium (Colace) 100 mg Q12H  PRN PO .CONSTIPATION;  Start 3/22/19 at 


01:00


Bisacodyl (Dulcolax) 5 mg DAILY  PRN PO .CONSTIPATION;  Start 3/22/19 at 01:00


Pantoprazole 80 mg/Sodium Chloride 100 ml @  10 mls/hr Q10H IV ;  Start 3/22/19 


at 08:32


Coded Allergies:  


     No Known Allergy (Unverified , 3/21/19)





Past Surgical History


Past Surgical Hx:  no surgical history





Family History


Significant Family History:  no pertinent family hx





Social History


Smoking Status:  Current every day smoker


Drug Use:  other (Heroin use)





Exam/Review of Systems


Vital Signs


Vitals





Vital Signs


  Date      Temp  Pulse  Resp  B/P (MAP)   Pulse Ox  O2          O2 Flow    FiO2


Time                                                 Delivery    Rate


   3/22/19           56    16      125/72        99  Room Air


     06:30                           (89)


   3/21/19  97.7


     22:16








Exam


Exam





General: Pleasant male currently lying in bed in no acute distress


HEENT: Atraumatic, normocephalic. The pupils are equal, round and reactive. Ext


raocular motor are intact


Neck: Supple with full range of motion. No rigidity or meningismus


Chest: Nontender


Lungs: Clear to auscultation bilaterally no crackles rales or wheezing


Heart: Normal S1-S2, Regular rhythm and rate. No murmur, S3, or S4


Abdomen: Soft , nontender,  nondistended , bowel sounds are present. No guarding


no rebound tenderness , No masses or organomegaly. No costovertebral temporal 


angle mass


Extremities: Normal to inspection, no edema no cyanosis


Skin: Multiple tattoos


Neurologic: Normal mental status, speech normal, cranial nerves II through XII 


are intact, motor and sensory are intact,











SYBIL HATFIELD                 Mar 22, 2019 06:44
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
